# Patient Record
Sex: MALE | Race: WHITE
[De-identification: names, ages, dates, MRNs, and addresses within clinical notes are randomized per-mention and may not be internally consistent; named-entity substitution may affect disease eponyms.]

---

## 2021-05-15 ENCOUNTER — HOSPITAL ENCOUNTER (EMERGENCY)
Dept: HOSPITAL 54 - ER | Age: 61
Discharge: HOME | End: 2021-05-15
Payer: MEDICAID

## 2021-05-15 VITALS — HEIGHT: 68 IN | WEIGHT: 150 LBS | BODY MASS INDEX: 22.73 KG/M2

## 2021-05-15 VITALS — DIASTOLIC BLOOD PRESSURE: 56 MMHG | SYSTOLIC BLOOD PRESSURE: 148 MMHG

## 2021-05-15 DIAGNOSIS — M54.42: Primary | ICD-10-CM

## 2021-05-15 DIAGNOSIS — Z79.899: ICD-10-CM

## 2021-05-15 PROCEDURE — 72110 X-RAY EXAM L-2 SPINE 4/>VWS: CPT

## 2021-05-15 PROCEDURE — 99283 EMERGENCY DEPT VISIT LOW MDM: CPT

## 2021-05-15 PROCEDURE — 96372 THER/PROPH/DIAG INJ SC/IM: CPT

## 2021-05-15 NOTE — NUR
Patient came in to the er c/o back pain radiating LLE x 10 days. On room air, 
breathing evenly and unlabored. Kept comfortable, will continue to monitor 
accordingly.